# Patient Record
Sex: MALE | Race: BLACK OR AFRICAN AMERICAN | NOT HISPANIC OR LATINO | Employment: UNEMPLOYED | URBAN - METROPOLITAN AREA
[De-identification: names, ages, dates, MRNs, and addresses within clinical notes are randomized per-mention and may not be internally consistent; named-entity substitution may affect disease eponyms.]

---

## 2019-08-01 ENCOUNTER — OUTSIDE PLACE OF SERVICE (OUTPATIENT)
Dept: CARDIOLOGY | Facility: CLINIC | Age: 54
End: 2019-08-01
Payer: MEDICAID

## 2019-08-01 PROCEDURE — 93010 PR ELECTROCARDIOGRAM REPORT: ICD-10-PCS | Mod: S$GLB,,, | Performed by: INTERNAL MEDICINE

## 2019-08-01 PROCEDURE — 93010 ELECTROCARDIOGRAM REPORT: CPT | Mod: S$GLB,,, | Performed by: INTERNAL MEDICINE

## 2019-08-14 ENCOUNTER — TELEPHONE (OUTPATIENT)
Dept: TRANSPLANT | Facility: CLINIC | Age: 54
End: 2019-08-14

## 2019-08-14 DIAGNOSIS — Z83.49 FAMILY HISTORY OF AMYLOIDOSIS: Primary | ICD-10-CM

## 2019-08-14 NOTE — TELEPHONE ENCOUNTER
Received phone call from Mr. Claude Briggs III, calling to be evaluated for amyloidosis. Explained that initial step includes collecting  TTR/DNA lab test.Informed of benefit and disadvantage of testing such as the ability to purchase disability or life insurance in the future if positive for amyloidosis, but has agreed to move forward with testing.  His father,  Claude Briggs has been diagnosed with cardiac  familial TTR amyloidosis and is currently under treatment with Vyndaqel. Denies any symptoms of chest pain or shortness of breath. Denies weakness or numbness in hands or lower extremeties.   Non fasting send out lab test has been ordered by Dr. Gerhard Dunn, scheduled at [facility] since he lives in [place], on August 15th at 10:00. Results may take up to 3 weeks. Will notify him of results. If positive, will discuss next step of amyloidosis evaluation. If negative, no further testing is necessary. Verbalized understanding of all instructions.

## 2019-08-28 ENCOUNTER — DOCUMENTATION ONLY (OUTPATIENT)
Dept: TRANSPLANT | Facility: CLINIC | Age: 54
End: 2019-08-28

## 2019-08-28 NOTE — NURSING
TTR/DNA results received for Mr. 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 Mr. Briggs notified of results. Denies swelling or pain or numbness in hands or lower extremites. Appt scheduled with Dr. Dunn as requested to follow up for amyloidosis on Wednesday, September 25th at 1:00 pm. Dr. Dunn has been notified of the results as well has Heart Failure Nurses.

## 2019-09-25 ENCOUNTER — HOSPITAL ENCOUNTER (OUTPATIENT)
Dept: CARDIOLOGY | Facility: CLINIC | Age: 54
Discharge: HOME | End: 2019-09-25
Attending: INTERNAL MEDICINE
Payer: MEDICAID

## 2019-09-25 ENCOUNTER — OFFICE VISIT (OUTPATIENT)
Dept: TRANSPLANT | Facility: CLINIC | Age: 54
End: 2019-09-25
Attending: INTERNAL MEDICINE
Payer: MEDICAID

## 2019-09-25 VITALS
DIASTOLIC BLOOD PRESSURE: 72 MMHG | SYSTOLIC BLOOD PRESSURE: 102 MMHG | HEART RATE: 96 BPM | WEIGHT: 174.19 LBS | BODY MASS INDEX: 26.4 KG/M2 | HEIGHT: 68 IN

## 2019-09-25 DIAGNOSIS — I10 ESSENTIAL HYPERTENSION: ICD-10-CM

## 2019-09-25 DIAGNOSIS — Z15.89 MONOALLELIC MUTATION OF TTR GENE: ICD-10-CM

## 2019-09-25 DIAGNOSIS — E78.00 PURE HYPERCHOLESTEROLEMIA: ICD-10-CM

## 2019-09-25 PROCEDURE — 99204 PR OFFICE/OUTPT VISIT, NEW, LEVL IV, 45-59 MIN: ICD-10-PCS | Mod: S$PBB,,, | Performed by: INTERNAL MEDICINE

## 2019-09-25 PROCEDURE — 99999 PR PBB SHADOW E&M-EST. PATIENT-LVL III: CPT | Mod: PBBFAC,,, | Performed by: INTERNAL MEDICINE

## 2019-09-25 PROCEDURE — 99204 OFFICE O/P NEW MOD 45 MIN: CPT | Mod: S$PBB,,, | Performed by: INTERNAL MEDICINE

## 2019-09-25 PROCEDURE — 93010 ELECTROCARDIOGRAM REPORT: CPT | Mod: S$PBB,59,, | Performed by: INTERNAL MEDICINE

## 2019-09-25 PROCEDURE — 99213 OFFICE O/P EST LOW 20 MIN: CPT | Mod: PBBFAC,25 | Performed by: INTERNAL MEDICINE

## 2019-09-25 PROCEDURE — 99999 PR PBB SHADOW E&M-EST. PATIENT-LVL III: ICD-10-PCS | Mod: PBBFAC,,, | Performed by: INTERNAL MEDICINE

## 2019-09-25 PROCEDURE — 93010 EKG 12-LEAD: ICD-10-PCS | Mod: S$PBB,59,, | Performed by: INTERNAL MEDICINE

## 2019-09-25 PROCEDURE — 93005 ELECTROCARDIOGRAM TRACING: CPT | Mod: PBBFAC | Performed by: INTERNAL MEDICINE

## 2019-09-25 NOTE — Clinical Note
elliot see addendum and be sure he obtains PYP scan--to date all negative--he did not get AL screen since only performing tests since he has genotype f

## 2019-09-25 NOTE — PROGRESS NOTES
Subjective:     Patient ID:  Claude Briggs is a 54 y.o. male who presents for evaluation of Consult (Genotype [id number])    HPI:  53 yo WM here after genetic screen for TTR returned +Danielle 142 Ile.  His father has cardiac h-TTR amyloidosis.  His other siblings have not been checked yet to his knowledge.  He has no active CV symptom, neuropathy, carpal tunnel, eye issues (wears glasses), bruising, tongue swelling, GI issues of importance, etc.    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       Past Surgical History:   Procedure Laterality Date    none         Family History   Problem Relation Age of Onset    Diabetes Mother     Parkinsonism Mother     Heart disease Father         cardiac amyloidosis    No Known Problems Sister     Hypertension Brother     No Known Problems Sister     Hypertension Brother        Social History     Socioeconomic History    Marital status:    Tobacco Use    Smoking status: Never Smoker    Smokeless tobacco: Never Used   Substance and Sexual Activity    Alcohol use: Yes     Comment: occasional beer    Drug use: Never     MEDICATIONS:  NONE    Review of patient's allergies indicates:  No Known Allergies    Review of Systems   Constitution: Negative for chills, diaphoresis, fever, malaise/fatigue (had fatigue August 2019 but resolved), night sweats, weight gain and weight loss. Decreased appetite: In Aug 2019 but resolved.   HENT: Negative for ear discharge, ear pain, hearing loss and hoarse voice.    Eyes: Positive for visual disturbance (glassess). Negative for photophobia.   Cardiovascular: Negative for chest pain, dyspnea on exertion, irregular heartbeat, leg swelling, orthopnea, palpitations, paroxysmal nocturnal dyspnea and syncope.   Respiratory: Negative for cough, hemoptysis, shortness of breath, sputum production and wheezing.    Endocrine:  "Negative for cold intolerance, heat intolerance, polydipsia and polyuria.   Hematologic/Lymphatic: Negative for bleeding problem. Does not bruise/bleed easily.   Musculoskeletal: Negative for arthritis, back pain and joint pain.        No symptoms of carpal tunnel   Gastrointestinal: Negative for abdominal pain, anorexia, change in bowel habit, diarrhea (he has diarrhea approx 2 weeks Aug 2019 ), dysphagia, heartburn, hematochezia and melena.   Genitourinary: Negative for dysuria, frequency and hematuria.   Neurological: Negative for brief paralysis, dizziness, focal weakness, headaches, light-headedness, loss of balance, numbness, paresthesias, seizures, sensory change and weakness.        No symptoms of carpal tunnel     Objective:   Physical Exam   Constitutional: He is oriented to person, place, and time. He appears well-developed and well-nourished. No distress.   /72 (BP Location: Right arm, Patient Position: Sitting, BP Method: Large (Automatic))   Pulse 96   Ht 5' 8" (1.727 m)   Wt 79 kg (174 lb 2.6 oz)   BMI 26.48 kg/m²      HENT:   Head: Normocephalic and atraumatic.   Mouth/Throat: Oropharynx is clear and moist. No oropharyngeal exudate.   Eyes: Conjunctivae are normal. Right eye exhibits no discharge. Left eye exhibits no discharge. No scleral icterus.   Neck: No JVD present. No tracheal deviation present. No thyromegaly present.   Cardiovascular: Normal rate, regular rhythm, normal heart sounds and intact distal pulses. Exam reveals no gallop.   No murmur heard.  Pulmonary/Chest: Effort normal and breath sounds normal.   Abdominal: Soft. Bowel sounds are normal. He exhibits no distension and no mass. There is no tenderness. There is no rebound and no guarding.   Musculoskeletal: He exhibits no edema or tenderness.   Neurological: He is alert and oriented to person, place, and time.   Skin: Skin is warm and dry. He is not diaphoretic.   Psychiatric: He has a normal mood and affect. His behavior " is normal. Judgment and thought content normal.      Assessment:     1. 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 Essential hypertension    3. Pure hypercholesterolemia      Plan:   Warning S&S reviewed  He understands that his children need to be warned of his mutation though would not necessarily screen for genotype at this time and certainly not until after life insurance, disability insurance obtained if desired by them  EKG  ECHO  PYP  Phone review    ADDENDUM 10/1/2019  ECHO 9/30/19 Conclusion   · Normal left ventricular systolic function. The estimated ejection fraction is 55%  · Normal LV diastolic function.  · No wall motion abnormalities.  · Normal right ventricular systolic function.  · Mild right ventricular enlargement.  · Normal central venous pressure (3 mm Hg).  · The left ventricular global longitudinal strain is near normal at -17.2%. While strain is best at the apex, there is not a clear pattern consistent with infiltrative disease.     EKG 9/25/19 NSR, WNL    PLAN:  Proceed with PYP scan as most sensitive definitively diagnostic test for TTR     Will ask CHF nurse to call him tomorrow with above results and plan

## 2019-09-30 ENCOUNTER — HOSPITAL ENCOUNTER (OUTPATIENT)
Dept: CARDIOLOGY | Facility: CLINIC | Age: 54
Discharge: HOME | End: 2019-09-30
Attending: INTERNAL MEDICINE
Payer: MEDICAID

## 2019-09-30 VITALS
BODY MASS INDEX: 26.37 KG/M2 | HEIGHT: 68 IN | SYSTOLIC BLOOD PRESSURE: 150 MMHG | DIASTOLIC BLOOD PRESSURE: 92 MMHG | HEART RATE: 65 BPM | WEIGHT: 174 LBS

## 2019-09-30 DIAGNOSIS — I10 ESSENTIAL HYPERTENSION: ICD-10-CM

## 2019-09-30 DIAGNOSIS — Z15.89 MONOALLELIC MUTATION OF TTR GENE: ICD-10-CM

## 2019-09-30 LAB
ASCENDING AORTA: 3.01 CM
AV INDEX (PROSTH): 0.8
AV MEAN GRADIENT: 3 MMHG
AV PEAK GRADIENT: 5 MMHG
AV VALVE AREA: 3.08 CM2
AV VELOCITY RATIO: 0.78
BSA FOR ECHO PROCEDURE: 1.95 M2
CV ECHO LV RWT: 0.39 CM
DOP CALC AO PEAK VEL: 1.13 M/S
DOP CALC AO VTI: 21.39 CM
DOP CALC LVOT AREA: 3.9 CM2
DOP CALC LVOT DIAMETER: 2.22 CM
DOP CALC LVOT PEAK VEL: 0.88 M/S
DOP CALC LVOT STROKE VOLUME: 65.96 CM3
DOP CALCLVOT PEAK VEL VTI: 17.05 CM
E WAVE DECELERATION TIME: 152.24 MSEC
E/A RATIO: 0.97
E/E' RATIO: 6.84 M/S
ECHO LV POSTERIOR WALL: 0.88 CM (ref 0.6–1.1)
FRACTIONAL SHORTENING: 31 % (ref 28–44)
INTERVENTRICULAR SEPTUM: 0.88 CM (ref 0.6–1.1)
IVRT: 0.13 MSEC
LA MAJOR: 4.57 CM
LA MINOR: 4.38 CM
LA WIDTH: 3.76 CM
LEFT ATRIUM SIZE: 2.93 CM
LEFT ATRIUM VOLUME INDEX: 21.7 ML/M2
LEFT ATRIUM VOLUME: 41.89 CM3
LEFT INTERNAL DIMENSION IN SYSTOLE: 3.11 CM (ref 2.1–4)
LEFT VENTRICLE DIASTOLIC VOLUME INDEX: 47.4 ML/M2
LEFT VENTRICLE DIASTOLIC VOLUME: 91.32 ML
LEFT VENTRICLE MASS INDEX: 66 G/M2
LEFT VENTRICLE SYSTOLIC VOLUME INDEX: 19.9 ML/M2
LEFT VENTRICLE SYSTOLIC VOLUME: 38.25 ML
LEFT VENTRICULAR INTERNAL DIMENSION IN DIASTOLE: 4.48 CM (ref 3.5–6)
LEFT VENTRICULAR MASS: 127.94 G
LV LATERAL E/E' RATIO: 5 M/S
LV SEPTAL E/E' RATIO: 10.83 M/S
MV PEAK A VEL: 0.67 M/S
MV PEAK E VEL: 0.65 M/S
PULM VEIN S/D RATIO: 1.5
PV PEAK D VEL: 0.5 M/S
PV PEAK S VEL: 0.75 M/S
RA MAJOR: 4.19 CM
RA PRESSURE: 3 MMHG
RA WIDTH: 3.45 CM
RETIRED EF AND QEF - SEE NOTES: 55 %
RIGHT VENTRICULAR END-DIASTOLIC DIMENSION: 4.57 CM
RV TISSUE DOPPLER FREE WALL SYSTOLIC VELOCITY 1 (APICAL 4 CHAMBER VIEW): 10.87 CM/S
SINUS: 3.3 CM
STJ: 2.71 CM
TDI LATERAL: 0.13 M/S
TDI SEPTAL: 0.06 M/S
TDI: 0.1 M/S

## 2019-09-30 PROCEDURE — 93306 TTE W/DOPPLER COMPLETE: CPT | Mod: PBBFAC | Performed by: INTERNAL MEDICINE

## 2019-09-30 PROCEDURE — 93306 ECHO (CUPID ONLY): ICD-10-PCS | Mod: 26,S$PBB,, | Performed by: INTERNAL MEDICINE

## 2019-10-02 ENCOUNTER — TELEPHONE (OUTPATIENT)
Dept: TRANSPLANT | Facility: CLINIC | Age: 54
End: 2019-10-02

## 2019-10-02 NOTE — TELEPHONE ENCOUNTER
Per Dr. Dunn I informed pt of normal ekg, and echo results, and encouraged to to get PYP Scan done when scheduled. Msg sent to appt coordinators to schedule 1st available appt. Pt verbalized understanding.

## 2019-10-03 ENCOUNTER — TELEPHONE (OUTPATIENT)
Dept: CARDIOLOGY | Facility: CLINIC | Age: 54
End: 2019-10-03

## 2019-10-07 ENCOUNTER — CLINICAL SUPPORT (OUTPATIENT)
Dept: CARDIOLOGY | Facility: CLINIC | Age: 54
End: 2019-10-07
Attending: INTERNAL MEDICINE
Payer: MEDICAID

## 2019-10-07 DIAGNOSIS — Z15.89 MONOALLELIC MUTATION OF TTR GENE: ICD-10-CM

## 2019-10-07 LAB — OHS CV PLANAR SCORE: 2

## 2019-10-07 PROCEDURE — 78803 RP LOCLZJ TUM SPECT 1 AREA: CPT | Mod: PBBFAC | Performed by: INTERNAL MEDICINE

## 2019-10-07 PROCEDURE — 78803 CV PYP ATTR W SPECT (CUPID ONLY): ICD-10-PCS | Mod: 26,S$PBB,, | Performed by: INTERNAL MEDICINE

## 2019-10-11 ENCOUNTER — TELEPHONE (OUTPATIENT)
Dept: TRANSPLANT | Facility: CLINIC | Age: 54
End: 2019-10-11

## 2019-10-11 NOTE — TELEPHONE ENCOUNTER
Received phone call from Mr. Briggs, III asking for PYP results, echo & EKG. Explained that everything was normal, no evidence of TTR amyloidosis at this time. Denies neurological symptoms. Instructed Mr. Briggs to return to clinic in year for annual followup. Verbalized understanding

## 2024-08-30 ENCOUNTER — TELEPHONE (OUTPATIENT)
Dept: TRANSPLANT | Facility: CLINIC | Age: 59
End: 2024-08-30

## 2024-09-27 ENCOUNTER — TELEPHONE (OUTPATIENT)
Dept: TRANSPLANT | Facility: CLINIC | Age: 59
End: 2024-09-27
Payer: MEDICAID

## 2024-09-27 DIAGNOSIS — Z15.89 MONOALLELIC MUTATION OF TTR GENE: ICD-10-CM

## 2024-09-27 DIAGNOSIS — I50.30 DIASTOLIC HEART FAILURE, UNSPECIFIED HF CHRONICITY: Primary | ICD-10-CM

## 2024-09-27 NOTE — TELEPHONE ENCOUNTER
I had received phone call from Mr. Claude Briggs requesting amyloid appt. He was last seen in clinic in 2019 for evaluation as he had TTR DNA testing completed because his father had hTTR amyloidosis. His DNA test was positive so echo and PYP scan was completed and found to me negative at the time. He was instructed to return to clinic in 3 years or earlier if symptoms developed.     He stated that he is having heart issues like his father had. Appt was scheduled with Dr. Vergara on 10/18/24 at 230 with echo and PYP scheduled prior to his appt. Amyloid & urine lab work was scheduled 10/14/24. Lab included FLC, serum/urine KRYSTAL, CMP, BNP, NT Pro BNP, Troponin T.  Patient was in agreement with all dates and times.     Mariam Antony RN  Amyloid Nurse Navigator.

## 2024-09-30 ENCOUNTER — TELEPHONE (OUTPATIENT)
Dept: TRANSPLANT | Facility: CLINIC | Age: 59
End: 2024-09-30
Payer: COMMERCIAL

## 2024-10-10 ENCOUNTER — TELEPHONE (OUTPATIENT)
Dept: TRANSPLANT | Facility: CLINIC | Age: 59
End: 2024-10-10
Payer: COMMERCIAL

## 2024-10-10 NOTE — TELEPHONE ENCOUNTER
10/10/24: received notification from pre auth that PYP was denied for Mr. Claude Briggs therefore PYP was canceled. Will re-attempt after Echo completed if deemed necessary.    Attempted to call Mr. Briggs regarding canceled PYP but could only leave a message to return my call. Will try again.    Mariam Antony RN  Amyloid Nurse Navigator

## 2024-10-18 ENCOUNTER — HOSPITAL ENCOUNTER (OUTPATIENT)
Dept: CARDIOLOGY | Facility: HOSPITAL | Age: 59
Discharge: HOME | End: 2024-10-18
Attending: INTERNAL MEDICINE
Payer: COMMERCIAL

## 2024-10-18 ENCOUNTER — HOSPITAL ENCOUNTER (OUTPATIENT)
Dept: CARDIOLOGY | Facility: CLINIC | Age: 59
Discharge: HOME | End: 2024-10-18
Attending: INTERNAL MEDICINE
Payer: COMMERCIAL

## 2024-10-18 ENCOUNTER — OFFICE VISIT (OUTPATIENT)
Dept: TRANSPLANT | Facility: CLINIC | Age: 59
End: 2024-10-18
Payer: COMMERCIAL

## 2024-10-18 VITALS
WEIGHT: 139.75 LBS | BODY MASS INDEX: 21.18 KG/M2 | DIASTOLIC BLOOD PRESSURE: 59 MMHG | SYSTOLIC BLOOD PRESSURE: 101 MMHG | HEART RATE: 95 BPM | HEIGHT: 68 IN

## 2024-10-18 VITALS
HEART RATE: 68 BPM | HEIGHT: 68 IN | DIASTOLIC BLOOD PRESSURE: 70 MMHG | BODY MASS INDEX: 26.36 KG/M2 | WEIGHT: 173.94 LBS | SYSTOLIC BLOOD PRESSURE: 108 MMHG

## 2024-10-18 DIAGNOSIS — I50.30 DIASTOLIC HEART FAILURE, UNSPECIFIED HF CHRONICITY: ICD-10-CM

## 2024-10-18 DIAGNOSIS — Z15.89 MONOALLELIC MUTATION OF TTR GENE: ICD-10-CM

## 2024-10-18 DIAGNOSIS — Z15.89 MONOALLELIC MUTATION OF TTR GENE: Primary | ICD-10-CM

## 2024-10-18 LAB
ASCENDING AORTA: 2.86 CM
AV AREA BY CONTINUOUS VTI: 2.8 CM2
AV INDEX (PROSTH): 0.73
AV LVOT MEAN GRADIENT: 2 MMHG
AV LVOT PEAK GRADIENT: 3 MMHG
AV MEAN GRADIENT: 3.4 MMHG
AV PEAK GRADIENT: 5.8 MMHG
AV VALVE AREA BY VELOCITY RATIO: 2.8 CM²
AV VALVE AREA: 2.8 CM2
AV VELOCITY RATIO: 0.75
BSA FOR ECHO PROCEDURE: 1.95 M2
CV ECHO LV RWT: 0.44 CM
DOP CALC AO PEAK VEL: 1.2 M/S
DOP CALC AO VTI: 20.2 CM
DOP CALC LVOT AREA: 3.8 CM2
DOP CALC LVOT DIAMETER: 2.2 CM
DOP CALC LVOT PEAK VEL: 0.9 M/S
DOP CALC LVOT STROKE VOLUME: 56.2 CM3
DOP CALC RVOT AREA: 3.2 CM2
DOP CALC RVOT DIAMETER: 2.02 CM
DOP CALCLVOT PEAK VEL VTI: 14.8 CM
E WAVE DECELERATION TIME: 263.45 MS
E/A RATIO: 1.14
E/E' RATIO: 7.33 M/S
ECHO EF ESTIMATED: 48 %
ECHO LV POSTERIOR WALL: 1.1 CM (ref 0.6–1.1)
EJECTION FRACTION: 40 %
FRACTIONAL SHORTENING: 24 % (ref 28–44)
GLOBAL LONGITUIDAL STRAIN: 12.3 %
INTERVENTRICULAR SEPTUM: 0.6 CM (ref 0.6–1.1)
IVC DIAMETER: 1.03 CM
IVRT: 105.61 MS
LA MAJOR: 6.06 CM
LA MINOR: 5.95 CM
LA WIDTH: 3.34 CM
LEFT ATRIUM SIZE: 2.71 CM
LEFT ATRIUM VOLUME INDEX MOD: 28.8 ML/M2
LEFT ATRIUM VOLUME INDEX: 23.9 ML/M2
LEFT ATRIUM VOLUME MOD: 55.59 ML
LEFT ATRIUM VOLUME: 46.2 CM3
LEFT INTERNAL DIMENSION IN SYSTOLE: 3.8 CM (ref 2.1–4)
LEFT VENTRICLE DIASTOLIC VOLUME INDEX: 61.53 ML/M2
LEFT VENTRICLE DIASTOLIC VOLUME: 118.75 ML
LEFT VENTRICLE MASS INDEX: 76.1 G/M2
LEFT VENTRICLE SYSTOLIC VOLUME INDEX: 32.2 ML/M2
LEFT VENTRICLE SYSTOLIC VOLUME: 62.1 ML
LEFT VENTRICULAR INTERNAL DIMENSION IN DIASTOLE: 5 CM (ref 3.5–6)
LEFT VENTRICULAR MASS: 146.8 G
LV LATERAL E/E' RATIO: 6.6
LV SEPTAL E/E' RATIO: 8.25
MV A" WAVE DURATION": 97.05 MS
MV PEAK A VEL: 0.58 M/S
MV PEAK E VEL: 0.66 M/S
OHS CV RV/LV RATIO: 0.9 CM
OHS LV EJECTION FRACTION SIMPSONS BIPLANE MOD: 39 %
OHS QRS DURATION: 100 MS
OHS QTC CALCULATION: 467 MS
PISA TR MAX VEL: 1.31 M/S
PULM VEIN A" WAVE DURATION": 97.05 MS
PULM VEIN S/D RATIO: 1.2
PULMONIC VEIN PEAK A VELOCITY: 0.1 M/S
PV PEAK D VEL: 0.41 M/S
PV PEAK S VEL: 0.49 M/S
RA MAJOR: 5.15 CM
RA PRESSURE ESTIMATED: 3 MMHG
RA WIDTH: 3.9 CM
RIGHT ATRIAL AREA: 17.2 CM2
RIGHT VENTRICLE DIASTOLIC BASEL DIMENSION: 4.5 CM
RV TB RVSP: 4 MMHG
RV TISSUE DOPPLER FREE WALL SYSTOLIC VELOCITY 1 (APICAL 4 CHAMBER VIEW): 13.64 CM/S
SINUS: 2.95 CM
STJ: 2.56 CM
TDI LATERAL: 0.1 M/S
TDI SEPTAL: 0.08 M/S
TDI: 0.09 M/S
TR MAX PG: 7 MMHG
TRICUSPID ANNULAR PLANE SYSTOLIC EXCURSION: 1.79 CM
TV PEAK GRADIENT: 7 MMHG
TV REST PULMONARY ARTERY PRESSURE: 10 MMHG
Z-SCORE OF LEFT VENTRICULAR DIMENSION IN END DIASTOLE: -0.89
Z-SCORE OF LEFT VENTRICULAR DIMENSION IN END SYSTOLE: 0.97

## 2024-10-18 PROCEDURE — 93005 ELECTROCARDIOGRAM TRACING: CPT | Mod: S$GLB,,, | Performed by: INTERNAL MEDICINE

## 2024-10-18 PROCEDURE — 99999 PR PBB SHADOW E&M-EST. PATIENT-LVL III: CPT | Mod: PBBFAC,,, | Performed by: INTERNAL MEDICINE

## 2024-10-18 PROCEDURE — 93356 MYOCRD STRAIN IMG SPCKL TRCK: CPT

## 2024-10-18 PROCEDURE — 93306 TTE W/DOPPLER COMPLETE: CPT

## 2024-10-18 PROCEDURE — 93306 TTE W/DOPPLER COMPLETE: CPT | Mod: 26,,, | Performed by: INTERNAL MEDICINE

## 2024-10-18 PROCEDURE — 93010 ELECTROCARDIOGRAM REPORT: CPT | Mod: S$GLB,,, | Performed by: STUDENT IN AN ORGANIZED HEALTH CARE EDUCATION/TRAINING PROGRAM

## 2024-10-18 PROCEDURE — 93356 MYOCRD STRAIN IMG SPCKL TRCK: CPT | Mod: ,,, | Performed by: INTERNAL MEDICINE

## 2024-10-18 RX ORDER — ACETAMINOPHEN 500 MG
1 TABLET ORAL NIGHTLY
COMMUNITY
Start: 2024-03-13

## 2024-10-18 RX ORDER — DAPAGLIFLOZIN 10 MG/1
1 TABLET, FILM COATED ORAL EVERY MORNING
COMMUNITY
Start: 2024-09-20

## 2024-10-18 RX ORDER — ASCORBIC ACID 500 MG
500 TABLET ORAL DAILY
COMMUNITY

## 2024-10-18 RX ORDER — FUROSEMIDE 40 MG/1
1 TABLET ORAL EVERY MORNING
COMMUNITY
Start: 2024-09-20

## 2024-10-18 RX ORDER — METOPROLOL SUCCINATE 25 MG/1
0.5 TABLET, EXTENDED RELEASE ORAL NIGHTLY
COMMUNITY
Start: 2024-09-20

## 2024-10-18 RX ORDER — SPIRONOLACTONE 25 MG/1
1 TABLET ORAL EVERY MORNING
COMMUNITY
Start: 2024-09-20

## 2024-10-18 RX ORDER — BUSPIRONE HYDROCHLORIDE 15 MG/1
1 TABLET ORAL 2 TIMES DAILY
COMMUNITY
Start: 2024-09-03

## 2024-10-18 RX ORDER — PANTOPRAZOLE SODIUM 40 MG/1
1 TABLET, DELAYED RELEASE ORAL DAILY
COMMUNITY
Start: 2024-08-28

## 2024-10-18 RX ORDER — ACETAMINOPHEN 500 MG/1
TABLET ORAL
COMMUNITY

## 2024-10-18 RX ORDER — TAMSULOSIN HYDROCHLORIDE 0.4 MG/1
1 CAPSULE ORAL DAILY
COMMUNITY

## 2024-10-18 NOTE — PROGRESS NOTES
[facility] Follow up.        Attending Physician: Aravind Earl MD.  The patient's last visit with me was on Visit date not found.         HPIZaire Briggs is a 59 y.o. male who presents for evaluation of Consult (Genotype [id number]). He had echo last August with LVEF 10-15%. Today he had follow up echo, after his referring cardiologist started medical treatment, and LVEF has increased to 40%. Normal wall thickness. He feels well. And he denies congestive symptoms.       Review of Systems   Constitutional:  Negative for activity change, appetite change, chills, diaphoresis, fatigue and fever.   HENT:  Negative for nasal congestion, rhinorrhea and sore throat.    Eyes:  Negative for visual disturbance.   Respiratory:  Negative for cough, choking, chest tightness and shortness of breath.    Cardiovascular:  Negative for chest pain, palpitations and leg swelling.   Gastrointestinal:  Negative for abdominal distention, abdominal pain, diarrhea, nausea and vomiting.   Genitourinary:  Negative for difficulty urinating, dysuria and hematuria.   Integumentary:  Negative for rash.   Neurological:  Negative for seizures, syncope and light-headedness.   Psychiatric/Behavioral:  Negative for agitation and hallucinations.         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        Past Surgical History:   Procedure Laterality Date    none          Family History   Problem Relation Name Age of Onset    Diabetes Mother      Parkinsonism Mother      Heart disease Father          cardiac amyloidosis    No Known Problems Sister      Hypertension Brother      No Known Problems Sister      Hypertension Brother          Review of patient's allergies indicates:  No Known Allergies     Current  "Outpatient Medications   Medication Instructions    ascorbic acid (vitamin C) (VITAMIN C) 500 mg, Daily    busPIRone (BUSPAR) 15 MG tablet 1 tablet, 2 times daily    dapagliflozin propanediol (FARXIGA) 10 mg tablet 1 tablet, Every morning    furosemide (LASIX) 40 MG tablet 1 tablet, Every morning    melatonin (MELATIN) 5 mg 1 tablet, Nightly    metoprolol succinate (TOPROL-XL) 25 MG 24 hr tablet 0.5 tablets, Nightly    PAIN RELIEF, ACETAMINOPHEN, 500 mg tablet TAKE TWO TABLETS BY MOUTH EVERY 6 HOURS for 10 days    pantoprazole (PROTONIX) 40 MG tablet 1 tablet, Daily    spironolactone (ALDACTONE) 25 MG tablet 1 tablet, Every morning    tamsulosin (FLOMAX) 0.4 mg Cap 1 capsule, Daily        Vitals:    10/18/24 1406   BP: (!) 101/59   Pulse: 95        Wt Readings from Last 3 Encounters:   10/18/24 63.4 kg (139 lb 12.4 oz)   10/18/24 78.9 kg (173 lb 15.1 oz)   09/30/19 78.9 kg (174 lb)     Temp Readings from Last 3 Encounters:   No data found for Temp     BP Readings from Last 3 Encounters:   10/18/24 (!) 101/59   10/18/24 108/70   09/30/19 (!) 150/92     Pulse Readings from Last 3 Encounters:   10/18/24 95   10/18/24 68   09/30/19 65        Body mass index is 21.25 kg/m². Estimated body surface area is 1.74 meters squared as calculated from the following:    Height as of this encounter: 5' 8" (1.727 m).    Weight as of this encounter: 63.4 kg (139 lb 12.4 oz).     Physical Exam  Constitutional:       Appearance: He is well-developed.   HENT:      Head: Normocephalic and atraumatic.      Right Ear: External ear normal.      Left Ear: External ear normal.   Eyes:      Conjunctiva/sclera: Conjunctivae normal.      Pupils: Pupils are equal, round, and reactive to light.   Neck:      Vascular: No hepatojugular reflux or JVD.   Cardiovascular:      Rate and Rhythm: Normal rate and regular rhythm.      Pulses: Intact distal pulses.      Heart sounds: S1 normal and S2 normal. No murmur heard.     No friction rub. No gallop. "   Pulmonary:      Effort: Pulmonary effort is normal.      Breath sounds: Normal breath sounds.   Abdominal:      General: Bowel sounds are normal. There is no distension.      Palpations: Abdomen is soft.      Tenderness: There is no abdominal tenderness. There is no guarding or rebound.   Musculoskeletal:      Cervical back: Normal range of motion and neck supple.      Right lower leg: No edema.      Left lower leg: No edema.   Neurological:      Mental Status: He is alert and oriented to person, place, and time.          Lab Results   Component Value Date    BNP 3,630 (H) 07/09/2024     (L) 08/13/2024    K 4.1 08/13/2024     08/13/2024    CO2 24 08/13/2024    BUN 10 08/13/2024    CREATININE 0.61 (L) 08/13/2024    WBC 12.1 (H) 03/13/2024    HGB 9.5 (L) 03/13/2024    HCT 29.8 (L) 03/13/2024    INR 1.5 08/14/2024    TSH 4.77 02/23/2024    TRIG 238 (H) 03/04/2024         Results for orders placed during the hospital encounter of 10/18/24    Echo Saline Bubble? No; Ultrasound enhancing contrast? No    Interpretation Summary  Images from the original result were not included.      Left Ventricle: The left ventricle is normal in size. Normal wall thickness. Mild global hypokinesis present. There is mildly reduced systolic function. Ejection fraction is approximately 40%. Quantitated ejection fraction is 39%. Global longitudinal strain is reduced measuring -12.3%. There is normal diastolic function. See text for details.    Right Ventricle: Normal right ventricular cavity size. Wall thickness is normal. Systolic function is normal.    Pulmonary Artery: The estimated pulmonary artery systolic pressure is <20 mmHg.    IVC/SVC: Normal venous pressure at 3 mmHg.        No results found for this or any previous visit.         Assessment and Plan:  There are no diagnoses linked to this encounter.      [facility]. Improved EF from 15% to 40%. No changes on medications today.  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 Plan for PYP scan.